# Patient Record
Sex: FEMALE | Race: WHITE | NOT HISPANIC OR LATINO | Employment: FULL TIME | URBAN - METROPOLITAN AREA
[De-identification: names, ages, dates, MRNs, and addresses within clinical notes are randomized per-mention and may not be internally consistent; named-entity substitution may affect disease eponyms.]

---

## 2019-10-12 ENCOUNTER — HOSPITAL ENCOUNTER (EMERGENCY)
Facility: HOSPITAL | Age: 44
Discharge: HOME | End: 2019-10-12
Attending: EMERGENCY MEDICINE
Payer: COMMERCIAL

## 2019-10-12 VITALS
DIASTOLIC BLOOD PRESSURE: 69 MMHG | HEIGHT: 68 IN | HEART RATE: 78 BPM | BODY MASS INDEX: 24.72 KG/M2 | OXYGEN SATURATION: 99 % | WEIGHT: 163.13 LBS | SYSTOLIC BLOOD PRESSURE: 129 MMHG | TEMPERATURE: 99 F | RESPIRATION RATE: 18 BRPM

## 2019-10-12 DIAGNOSIS — J00 ACUTE NASOPHARYNGITIS: ICD-10-CM

## 2019-10-12 DIAGNOSIS — B34.9 VIRAL ILLNESS: Primary | ICD-10-CM

## 2019-10-12 DIAGNOSIS — H92.03 OTALGIA OF BOTH EARS: ICD-10-CM

## 2019-10-12 DIAGNOSIS — J02.9 SORE THROAT: ICD-10-CM

## 2019-10-12 LAB
DEPRECATED S PYO AG THROAT QL EIA: NEGATIVE
INFLUENZA A, MOLECULAR: NEGATIVE
INFLUENZA B, MOLECULAR: NEGATIVE
SPECIMEN SOURCE: NORMAL

## 2019-10-12 PROCEDURE — 96372 THER/PROPH/DIAG INJ SC/IM: CPT | Mod: ER

## 2019-10-12 PROCEDURE — 87502 INFLUENZA DNA AMP PROBE: CPT | Mod: ER

## 2019-10-12 PROCEDURE — 87880 STREP A ASSAY W/OPTIC: CPT | Mod: ER

## 2019-10-12 PROCEDURE — 87081 CULTURE SCREEN ONLY: CPT

## 2019-10-12 PROCEDURE — 99284 EMERGENCY DEPT VISIT MOD MDM: CPT | Mod: 25,ER

## 2019-10-12 PROCEDURE — 63600175 PHARM REV CODE 636 W HCPCS: Mod: ER | Performed by: NURSE PRACTITIONER

## 2019-10-12 RX ORDER — DEXAMETHASONE SODIUM PHOSPHATE 4 MG/ML
8 INJECTION, SOLUTION INTRA-ARTICULAR; INTRALESIONAL; INTRAMUSCULAR; INTRAVENOUS; SOFT TISSUE
Status: COMPLETED | OUTPATIENT
Start: 2019-10-12 | End: 2019-10-12

## 2019-10-12 RX ORDER — BENZONATATE 100 MG/1
100 CAPSULE ORAL 3 TIMES DAILY PRN
Qty: 20 CAPSULE | Refills: 0 | Status: SHIPPED | OUTPATIENT
Start: 2019-10-12 | End: 2019-10-22

## 2019-10-12 RX ORDER — IBUPROFEN 600 MG/1
600 TABLET ORAL EVERY 6 HOURS PRN
Qty: 30 TABLET | Refills: 0 | Status: SHIPPED | OUTPATIENT
Start: 2019-10-12

## 2019-10-12 RX ORDER — AZITHROMYCIN 250 MG/1
TABLET, FILM COATED ORAL
Qty: 6 TABLET | Refills: 0 | Status: SHIPPED | OUTPATIENT
Start: 2019-10-12 | End: 2019-10-12 | Stop reason: CLARIF

## 2019-10-12 RX ORDER — PREDNISONE 20 MG/1
TABLET ORAL
Qty: 18 TABLET | Refills: 0 | Status: SHIPPED | OUTPATIENT
Start: 2019-10-12

## 2019-10-12 RX ORDER — GUAIFENESIN 600 MG/1
1200 TABLET, EXTENDED RELEASE ORAL 2 TIMES DAILY
COMMUNITY
Start: 2019-10-12

## 2019-10-12 RX ADMIN — DEXAMETHASONE SODIUM PHOSPHATE 8 MG: 4 INJECTION, SOLUTION INTRA-ARTICULAR; INTRALESIONAL; INTRAMUSCULAR; INTRAVENOUS; SOFT TISSUE at 02:10

## 2019-10-12 NOTE — ED PROVIDER NOTES
Encounter Date: 10/12/2019       History     Chief Complaint   Patient presents with    Influenza     The history is provided by the patient.   URI   The primary symptoms include fatigue, ear pain (bilateral), sore throat, swollen glands, cough, wheezing and myalgias. Primary symptoms do not include fever, headaches, abdominal pain, nausea, vomiting, arthralgias or rash. The current episode started two days ago. This is a new problem. The problem has not changed since onset.  The fatigue began 2 days ago.   The ear pain began 2 days ago. Ear pain is a new problem. The ear pain has been unchanged since its onset. Both ears are affected. The ear pain is at a severity of 6/10.   The sore throat began 2 days ago. The sore throat has been unchanged since its onset. The sore throat pain is at a severity of 6/10.   The swelling is not associated with speech difficulty, neck pain or neck stiffness.   The cough began 2 days ago. The cough is productive. The sputum is clear, yellow and green (light green in the morning but clears up).   Wheezing occurs intermittently. The patient's medical history does not include asthma, COPD, bronchiolitis or chronic lung disease.   Myalgias began 2 days ago. The myalgias have been unchanged since their onset. The myalgias are aching and dull. The myalgias are not associated with weakness. The myalgia pain is at a severity of 6/10.   Symptoms associated with the illness include congestion and rhinorrhea. The illness is not associated with chills. The following treatments were addressed: Acetaminophen was not tried. A decongestant was not tried. NSAIDs were not tried.       PCP:   Primary Doctor No        Review of patient's allergies indicates:  No Known Allergies  History reviewed. No pertinent past medical history.  History reviewed. No pertinent surgical history.  History reviewed. No pertinent family history.  Social History     Tobacco Use    Smoking status: Never Smoker     Smokeless tobacco: Never Used   Substance Use Topics    Alcohol use: Not Currently     Frequency: Never    Drug use: Never     Review of Systems   Constitutional: Positive for fatigue. Negative for chills and fever.   HENT: Positive for congestion, ear pain (bilateral), postnasal drip, rhinorrhea and sore throat.    Eyes: Negative for discharge, itching and visual disturbance.   Respiratory: Positive for cough, chest tightness and wheezing. Negative for shortness of breath.    Cardiovascular: Negative for chest pain and palpitations.   Gastrointestinal: Negative for abdominal pain, diarrhea, nausea and vomiting.   Genitourinary: Negative for dysuria.   Musculoskeletal: Positive for myalgias. Negative for arthralgias, back pain and neck pain.   Skin: Negative for rash.   Neurological: Negative for dizziness, speech difficulty, weakness, light-headedness and headaches.   Hematological: Does not bruise/bleed easily.   Psychiatric/Behavioral: Negative for confusion.       Physical Exam     Initial Vitals [10/12/19 1311]   BP Pulse Resp Temp SpO2   129/69 78 18 98.5 °F (36.9 °C) 99 %      MAP       --         Physical Exam    Nursing note and vitals reviewed.  Constitutional: Vital signs are normal. She appears well-developed and well-nourished. She is cooperative. She appears ill (and tired). No distress.   HENT:   Head: Normocephalic and atraumatic.   Right Ear: Hearing, external ear and ear canal normal. There is tenderness. Tympanic membrane is not injected, not erythematous and not bulging. A middle ear effusion (clear) is present.   Left Ear: Hearing, external ear and ear canal normal. There is tenderness. Tympanic membrane is not injected, not erythematous and not bulging. A middle ear effusion (clear) is present.   Nose: Mucosal edema and rhinorrhea (clear) present.   Mouth/Throat: Uvula is midline and mucous membranes are normal. Posterior oropharyngeal erythema present.   Eyes: Conjunctivae, EOM and lids are  normal. Pupils are equal, round, and reactive to light.   Neck: Trachea normal and normal range of motion. Neck supple. No spinous process tenderness and no muscular tenderness present. No neck rigidity.   Cardiovascular: Normal rate, regular rhythm, intact distal pulses and normal pulses.   Pulmonary/Chest: Effort normal and breath sounds normal. No accessory muscle usage. No respiratory distress. She has no decreased breath sounds. She has no wheezes. She has no rhonchi. She has no rales.   Patient has subjective complaints of chest wall pain with coughing.    Musculoskeletal: Normal range of motion. She exhibits no edema or tenderness.   Lymphadenopathy:     She has cervical adenopathy (tender and mobile bilaterally).        Right cervical: Superficial cervical adenopathy present.        Left cervical: Superficial cervical adenopathy present.   Neurological: She is alert and oriented to person, place, and time. She has normal strength. No sensory deficit. Gait normal. GCS eye subscore is 4. GCS verbal subscore is 5. GCS motor subscore is 6.   Neurovascular intact to all extremities.    Skin: Skin is warm, dry and intact. Capillary refill takes less than 2 seconds. No rash noted.   Normal color and turgor.    Psychiatric: She has a normal mood and affect. Her speech is normal and behavior is normal. Cognition and memory are normal.         ED Course   Procedures      ED Lab Results:   Results for orders placed or performed during the hospital encounter of 10/12/19   Influenza A & B by Molecular   Result Value Ref Range    Influenza A, Molecular Negative Negative    Influenza B, Molecular Negative Negative    Flu A & B Source NP    Rapid strep screen   Result Value Ref Range    Rapid Strep A Screen Negative Negative         ED Medications:   Medications   dexamethasone injection 8 mg (8 mg Intramuscular Given 10/12/19 1404)         ED Course Vitals  Vitals:    10/12/19 1311   BP: 129/69   BP Location: Left arm  "  Patient Position: Sitting   Pulse: 78   Resp: 18   Temp: 98.5 °F (36.9 °C)   TempSrc: Oral   SpO2: 99%   Weight: 74 kg (163 lb 2.3 oz)   Height: 5' 8" (1.727 m)         1425 HOURS RE-EVALUATION & DISPOSITION:   Reassessment at the time of disposition demonstrates that the patient is resting comfortably in no acute distress.  She has remained hemodynamically stable throughout the entire ED visit and is without objective evidence for acute process requiring urgent intervention or hospitalization. I discussed test results and provided counseling to patient with regard to condition, the treatment plan, specific conditions for return, and the importance of follow up.  Answered questions at this time. The patient is stable for discharge.               Medical Decision Making:   Clinical Tests:   Lab Tests: Ordered and Reviewed                      Clinical Impression:       ICD-10-CM ICD-9-CM   1. Viral illness B34.9 079.99   2. Sore throat J02.9 462   3. Acute nasopharyngitis J00 460   4. Otalgia of both ears H92.03 388.70           Disposition:   Disposition: Discharged  Condition: Stable  I discussed with patient that the evaluation in the emergency department does not suggest any emergent or life threatening medical condition requiring immediate intervention beyond what was provided in the ED, and I believe patient is safe for discharge.  Regardless, an unremarkable evaluation in the ED does not preclude the development or presence of a serious of life threatening condition. As such, patient was instructed to return immediately for any worsening or change in current symptoms. I also discussed the results of my evaluation and diagnosis with patient and she concurs with the evaluation and management plan.  Detailed written and verbal instructions provided to patient and she expressed a verbal understanding of the discharge instructions and overall management plan. Reiterated the importance of medication administration " and safety and advised patient to follow up with primary care provider in 3-5 days or sooner if needed.  Also advised patient to return to the ER for any complications.     Regarding SORE THROAT, recommended that the patient: rest more than usual; refrain from smoking or being in smoke-filled environment; drink juice, milk shakes, tea, or soup if throat is too sore to eat solid food; gargle with warm salt water; suck on crushed ice, hard candy, cough drops, or throat lozenges; and take acetaminophen or ibuprofen as needed for fever or pain.     Patient presents with upper respiratory and flulike symptoms consistent with a viral etiology. Based on my assessment in the ED, I do not suspect any respiratory, airway, pulmonary, cardiovascular (including myocarditis), metabolic, CNS, medical, or surgical emergency medical condition. I have discussed with the patient and/or caregiver signs and symptoms for secondary bacterial infections, such as pneumonia. I believe that the patient's symptoms are most consistent with a viral illness. Patient is safe for discharge home with conservative therapy.  I recommended that the patient treat the symptoms and recommended that they:  Rest; drink plenty of clear fluids; use nasal saline spray to clear nasal drainage and help with nasal congestion; take an antihistamine (Allegra, Claritin, or Zyrtec) to help dry mucus and postnasal drip; take Mucinex or Mucinex DM for cough and chest congestion; take ibuprofen or acetaminophen for any fever, headache, body aches, or other pain; gargle with warm salt water gargles or lozenges for throat comfort; and follow up with primary care provider if there is no improvement or a worsening of symptoms.    Regarding OTALGIA, for treatment, I advised patient to place a warm washcloth or heating pad against the affected ear and take over-the-counter acetaminophen or ibuprofen for pain.  For prevention, encouraged patient to: avoid placing objects in  ear; gently dry ears after showers or swimming with a soft towel and help water run out of ears by turning head to the side; use over-the-counter acetic acid or alcohol in ears after swimming; and keep ears clean by removing excess cerumen.           Discharge Medication List as of 10/12/2019  2:27 PM      START taking these medications    Details   benzonatate (TESSALON) 100 MG capsule Take 1 capsule (100 mg total) by mouth 3 (three) times daily as needed for Cough., Starting Sat 10/12/2019, Until Tue 10/22/2019, Print      guaiFENesin (MUCINEX) 600 mg 12 hr tablet Take 2 tablets (1,200 mg total) by mouth 2 (two) times daily., Starting Sat 10/12/2019, OTC      ibuprofen (ADVIL,MOTRIN) 600 MG tablet Take 1 tablet (600 mg total) by mouth every 6 (six) hours as needed (Fever and/or Pain)., Starting Sat 10/12/2019, Print      predniSONE (DELTASONE) 20 MG tablet Take 1 tablet 3 times per day for 3 days, then  Take 1 tablet 2 times per day for 3 days, then   Take 1 tablet daily for 3 days, Print               Follow-up Information     Call  Primary Care Provider.    Why:  If symptoms worsen                                  Roby Carpio NP  10/12/19 2639

## 2019-10-12 NOTE — DISCHARGE INSTRUCTIONS
The tests for influenza and strep throat were negative.    Do not begin the oral steroids until Monday since you received a steroid injection today.      Drink plenty of water and get lots of rest.

## 2019-10-15 LAB — BACTERIA THROAT CULT: NORMAL
